# Patient Record
Sex: FEMALE | Race: WHITE | NOT HISPANIC OR LATINO | Employment: FULL TIME | URBAN - METROPOLITAN AREA
[De-identification: names, ages, dates, MRNs, and addresses within clinical notes are randomized per-mention and may not be internally consistent; named-entity substitution may affect disease eponyms.]

---

## 2021-05-29 ENCOUNTER — RECORDS - HEALTHEAST (OUTPATIENT)
Dept: ADMINISTRATIVE | Facility: CLINIC | Age: 21
End: 2021-05-29

## 2021-06-02 ENCOUNTER — RECORDS - HEALTHEAST (OUTPATIENT)
Dept: ADMINISTRATIVE | Facility: CLINIC | Age: 21
End: 2021-06-02

## 2021-06-16 PROBLEM — R51.9 HEADACHE: Status: ACTIVE | Noted: 2017-06-13

## 2022-06-29 ENCOUNTER — HOSPITAL ENCOUNTER (EMERGENCY)
Facility: CLINIC | Age: 22
Discharge: HOME | End: 2022-06-29
Attending: FAMILY MEDICINE | Admitting: FAMILY MEDICINE

## 2022-06-29 VITALS
TEMPERATURE: 98.1 F | HEART RATE: 83 BPM | SYSTOLIC BLOOD PRESSURE: 167 MMHG | HEIGHT: 71 IN | OXYGEN SATURATION: 97 % | DIASTOLIC BLOOD PRESSURE: 102 MMHG | RESPIRATION RATE: 20 BRPM | BODY MASS INDEX: 41.02 KG/M2 | WEIGHT: 293 LBS

## 2022-06-29 DIAGNOSIS — J03.90 TONSILLITIS: ICD-10-CM

## 2022-06-29 LAB
DEPRECATED S PYO AG THROAT QL EIA: NEGATIVE
GROUP A STREP BY PCR: NOT DETECTED

## 2022-06-29 PROCEDURE — 87651 STREP A DNA AMP PROBE: CPT | Performed by: FAMILY MEDICINE

## 2022-06-29 PROCEDURE — 99283 EMERGENCY DEPT VISIT LOW MDM: CPT

## 2022-06-29 RX ORDER — AMOXICILLIN 875 MG
875 TABLET ORAL 2 TIMES DAILY
Qty: 20 TABLET | Refills: 0 | Status: SHIPPED | OUTPATIENT
Start: 2022-06-29 | End: 2022-07-09

## 2022-06-29 ASSESSMENT — ENCOUNTER SYMPTOMS
SORE THROAT: 1
FEVER: 0
RHINORRHEA: 1
COUGH: 0
CHILLS: 1

## 2022-06-29 NOTE — Clinical Note
Adolfo Delarosa was seen and treated in our emergency department on 6/29/2022.  She may return to work on 06/30/2022.       If you have any questions or concerns, please don't hesitate to call.      Yogesh Wei MD

## 2022-06-29 NOTE — ED TRIAGE NOTES
Arrives to ED with c/o sore throat that began yesterday. Reports white patches. Chills yesterday. Last took ibuprofen yesterday.      Triage Assessment     Row Name 06/29/22 2799       Triage Assessment (Adult)    Airway WDL WDL       Respiratory WDL    Respiratory WDL WDL       Skin Circulation/Temperature WDL    Skin Circulation/Temperature WDL WDL       Cardiac WDL    Cardiac WDL X  HTN       Peripheral/Neurovascular WDL    Peripheral Neurovascular WDL WDL       Cognitive/Neuro/Behavioral WDL    Cognitive/Neuro/Behavioral WDL WDL

## 2022-06-29 NOTE — ED PROVIDER NOTES
EMERGENCY DEPARTMENT ENCOUNTER      NAME: Adolfo Delarosa  AGE: 22 year old female  YOB: 2000  MRN: [id number]  EVALUATION DATE & TIME: 6/29/2022 11:17 AM    PCP: [facility]    ED PROVIDER: Yogesh Wei M.D.    Chief Complaint   Patient presents with     Pharyngitis       FINAL IMPRESSION:  1. Tonsillitis        ED COURSE & MEDICAL DECISION MAKING:    Pertinent Labs & Imaging studies personally reviewed and interpreted by me. (See chart for details)  11:20 AM Patient seen and examined, prior records reviewed. Discussed plan for discharge which patient is agreeable with.  Differential diagnosis includes but not limited to pharyngitis, viral syndrome, mononucleosis, strep pharyngitis, peritonsillar abscess, retropharyngeal abscess.  Patient presents with throat pain.  On exam, anterior cervical adenopathy as well as tonsillar erythema and exudate, strep negative.  Patient is started on amoxicillin for presumed tonsillitis and follow-up with primary care.  No evidence for peritonsillar abscess, no voice changes.      At the conclusion of the encounter I discussed the results of all of the tests and the disposition. The questions were answered. The patient or family acknowledged understanding and was agreeable with the care plan.     PROCEDURES:   Procedures    MEDICATIONS GIVEN IN THE EMERGENCY:  Medications - No data to display    NEW PRESCRIPTIONS STARTED AT TODAY'S ER VISIT  Discharge Medication List as of 6/29/2022 11:26 AM      START taking these medications    Details   amoxicillin (AMOXIL) 875 MG tablet Take 1 tablet (875 mg) by mouth 2 times daily for 10 days, Disp-20 tablet, R-0, E-Prescribe             =================================================================    HPI    Patient information was obtained from: Patient      Adolfo Delarosa is a 22 year old female with a pertinent history of pulmonary embolism (on Xarelto) and obesity who presents to  "this ED by private vehicle for evaluation of pharyngitis.     Patient states that on 6/28 (~1 day ago) she developed a sore throat that has continued to persist on bilaterally. She endorses a \"little\" rhinorrhea and chills. She denies congestion, fever, and cough. She states she has an allergy to Motrin but took an ibuprofen yesterday for her symptoms. Patient denies additional medical concerns or complaints at this time.     Of note, patient's sister was ill last week.      REVIEW OF SYSTEMS   Review of Systems   Constitutional: Positive for chills. Negative for fever.   HENT: Positive for rhinorrhea (\"little\") and sore throat (bilaterally). Negative for congestion.    Respiratory: Negative for cough.       All other systems reviewed and negative    PAST MEDICAL HISTORY:  Past Medical History:   Diagnosis Date     Deep vein thrombosis (H)      Factor V Leiden (H)      Pulmonary embolism (H)        PAST SURGICAL HISTORY:  Past Surgical History:   Procedure Laterality Date     CHOLECYSTECTOMY      age 10 gallbladder removed     LUMBAR PUNCTURE FLUORO GUIDED DIAGNOSTIC  7/6/2020       CURRENT MEDICATIONS:    No current facility-administered medications for this encounter.     Current Outpatient Medications   Medication     amoxicillin (AMOXIL) 875 MG tablet     acetaZOLAMIDE (DIAMOX) 125 MG tablet     naproxen (NAPROSYN) 500 MG tablet     prochlorperazine (COMPAZINE) 5 MG tablet     rivaroxaban (XARELTO) 15 mg Tab       ALLERGIES:  Allergies   Allergen Reactions     Motrin [Ibuprofen] Swelling     Hand swelling       FAMILY HISTORY:  Family History   Problem Relation Age of Onset     Cancer Mother        SOCIAL HISTORY:   Social History     Socioeconomic History     Marital status: Single   Tobacco Use     Smoking status: Current Every Day Smoker     Packs/day: 0.50     Types: Cigarettes   Substance and Sexual Activity     Alcohol use: No     Drug use: No     Sexual activity: Never     Partners: Female " "      VITALS:  BP (!) 167/102   Pulse 83   Temp 98.1  F (36.7  C) (Oral)   Resp 20   Ht 1.803 m (5' 11\")   Wt 136.1 kg (300 lb)   LMP 06/20/2022   SpO2 97%   BMI 41.84 kg/m      PHYSICAL EXAM:  Physical Exam  Vitals and nursing note reviewed.   Constitutional:       Appearance: Normal appearance.   HENT:      Head: Normocephalic and atraumatic.      Right Ear: External ear normal.      Left Ear: External ear normal.      Nose: Nose normal.      Mouth/Throat:      Mouth: Mucous membranes are moist.      Tonsils: Tonsillar exudate (bilaterally) present.      Comments: Bilateral tonsillary erythema  Eyes:      Extraocular Movements: Extraocular movements intact.      Conjunctiva/sclera: Conjunctivae normal.      Pupils: Pupils are equal, round, and reactive to light.   Cardiovascular:      Rate and Rhythm: Normal rate and regular rhythm.   Pulmonary:      Effort: Pulmonary effort is normal.      Breath sounds: Normal breath sounds. No wheezing or rales.   Abdominal:      General: Abdomen is flat. There is no distension.      Palpations: Abdomen is soft.      Tenderness: There is no abdominal tenderness. There is no guarding.   Musculoskeletal:         General: Normal range of motion.      Cervical back: Normal range of motion and neck supple.      Right lower leg: No edema.      Left lower leg: No edema.   Lymphadenopathy:      Cervical: Cervical adenopathy (bilateral) present.   Skin:     General: Skin is warm and dry.   Neurological:      General: No focal deficit present.      Mental Status: She is alert and oriented to person, place, and time. Mental status is at baseline.      Comments: No gross focal neurologic deficits   Psychiatric:         Mood and Affect: Mood normal.         Behavior: Behavior normal.         Thought Content: Thought content normal.       LAB:  All pertinent labs reviewed and interpreted.  Results for orders placed or performed during the hospital encounter of 06/29/22 "   Streptococcus A Rapid Screen w/Reflex to PCR    Specimen: Throat; Swab   Result Value Ref Range    Group A Strep antigen Negative Negative       RADIOLOGY:  Reviewed all pertinent imaging. Please see official radiology report.  No orders to display     I, Shirley Arteaga, am serving as a scribe to document services personally performed by Dr. Wei based on my observation and the provider's statements to me. I, Yogesh Wei MD attest that Shirley Arteaga is acting in a scribe capacity, has observed my performance of the services and has documented them in accordance with my direction.    Yogesh Wei M.D.  Emergency Medicine  [facility] EMERGENCY ROOM  [address] [address] [phone]  Dept: [phone]     Yogesh Wei MD  06/29/22 8825

## 2023-03-29 ENCOUNTER — TELEPHONE (OUTPATIENT)
Dept: NEUROLOGY | Facility: CLINIC | Age: 23
End: 2023-03-29
Payer: COMMERCIAL

## 2023-03-29 ENCOUNTER — HOSPITAL ENCOUNTER (EMERGENCY)
Facility: CLINIC | Age: 23
Discharge: HOME | End: 2023-03-29
Attending: EMERGENCY MEDICINE | Admitting: EMERGENCY MEDICINE
Payer: COMMERCIAL

## 2023-03-29 VITALS
SYSTOLIC BLOOD PRESSURE: 157 MMHG | HEIGHT: 71 IN | BODY MASS INDEX: 41.02 KG/M2 | DIASTOLIC BLOOD PRESSURE: 82 MMHG | WEIGHT: 293 LBS | RESPIRATION RATE: 18 BRPM | OXYGEN SATURATION: 99 % | TEMPERATURE: 98.3 F | HEART RATE: 67 BPM

## 2023-03-29 DIAGNOSIS — R51.9 HEADACHE: ICD-10-CM

## 2023-03-29 DIAGNOSIS — G93.2 PSEUDOTUMOR CEREBRI: ICD-10-CM

## 2023-03-29 PROCEDURE — 99284 EMERGENCY DEPT VISIT MOD MDM: CPT

## 2023-03-29 RX ORDER — METOCLOPRAMIDE 10 MG/1
10 TABLET ORAL 4 TIMES DAILY PRN
Qty: 15 TABLET | Refills: 0 | Status: SHIPPED | OUTPATIENT
Start: 2023-03-29

## 2023-03-29 RX ORDER — ACETAZOLAMIDE 250 MG/1
TABLET ORAL
Qty: 180 TABLET | Refills: 0 | Status: SHIPPED | OUTPATIENT
Start: 2023-03-29 | End: 2023-04-23

## 2023-03-29 RX ORDER — DIPHENHYDRAMINE HCL 25 MG
25 CAPSULE ORAL EVERY 6 HOURS PRN
Qty: 15 CAPSULE | Refills: 0 | Status: SHIPPED | OUTPATIENT
Start: 2023-03-29

## 2023-03-29 RX ORDER — TOPIRAMATE 50 MG/1
50 TABLET, FILM COATED ORAL 2 TIMES DAILY
Qty: 60 TABLET | Refills: 0 | Status: SHIPPED | OUTPATIENT
Start: 2023-03-29 | End: 2023-04-28

## 2023-03-29 ASSESSMENT — ENCOUNTER SYMPTOMS
NAUSEA: 0
SPEECH DIFFICULTY: 0
FACIAL ASYMMETRY: 0
BLOOD IN STOOL: 0
NUMBNESS: 0
CHILLS: 0
PHOTOPHOBIA: 1
HEADACHES: 1
MYALGIAS: 0
SHORTNESS OF BREATH: 0
COUGH: 0
WEAKNESS: 0
VOMITING: 0
FEVER: 0

## 2023-03-29 ASSESSMENT — ACTIVITIES OF DAILY LIVING (ADL): ADLS_ACUITY_SCORE: 35

## 2023-03-29 NOTE — ED PROVIDER NOTES
"EMERGENCY DEPARTMENT MIDLEVEL SUPERVISORY NOTE    Date/Time: 3/29/2023 6:12 PM    I am seeing this patient along with Gina Aguilar PA-C.  I have seen and evaluated the patient independently at bedside and agree with the GISELE's history, assessment and plan.  I personally saw the patient and performed a substantive portion of the visit including all aspects of the medical decision making.      BRIEF HPI    The patient reported having headaches. She reported having a long history of headaches and has had lumbar taps in the past with neurology. As of recent, she started having intermittent headaches and has been persistent for a couple of days then resolve on their own. Normally, her headaches resolve with sleep and worsens throughout the day. She is associated photophobia and sonophobia with her headaches. She notes blurry vision when her headaches are severe. She endorsed nausea earlier this morning, but has resolved since. She took 1200 mg ibuprofen this morning at 1100, with no significant relief. The patient denied any other complaints or concerns at this time.    BRIEF PHYSICAL EXAM  Vitals: BP (!) 157/82   Pulse 67   Temp 98.3  F (36.8  C) (Oral)   Resp 18   Ht 1.803 m (5' 11\")   Wt (!) 154.2 kg (340 lb)   LMP 02/25/2023 (Approximate)   SpO2 99%   BMI 47.42 kg/m    General: Appears in no acute distress, awake, alert, interactive.  Eyes: Conjunctivae clear.  EOMs intact.  Pupils round and reactive.  HENT: Atraumatic. MMM.   Neck: Full AROM.  Cardiovascular: Regular rate and rhythm.  Respiratory/Chest: Normal work of breathing. Speaking in full sentences.  Abdomen: Non-distended.  Musculoskeletal: Normal appearing extremities without obvious deformities or signs of trauma. No edema or erythema.  Skin: Normal color. No visible rash or diaphoresis.  Neurologic: Alert. Face symmetric, moves all extremities spontaneously. Speech clear.  Ambulatory.  Psychiatric: Affect appropriate, cooperative.      MEDICAL " DECISION MAKING    Pertinent Labs and Imaging reviewed (see chart for details)    Adolfo Delarosa is a 23 year old year old who presents for evaluation of a headache.  Records reviewed.  Patient has a history of pseudotumor cerebri diagnosed several years ago by lumbar puncture indicating elevated pressures.  She did briefly follow with neurology but then had difficulty with insurance so has not been able to follow-up.  In the past, she has undergone LP for management but not for several years.  Recently, she has been having headaches increasing in severity prompting her to present to [facility] ED on 1/9/2023.  At that time, neurology recommended a trial of Topamax 25 mg twice daily, MRI brain if not improving, LP if treatment resistant headaches, and follow-up in clinic.  Today, patient reports that she has been having headaches over the past week that have been more severe than what she had been experiencing previously.  She has tried using the Topamax but not had any relief.  She denies associated vision change and has no other new complaints.  Vitals on arrival notable for elevated blood pressure but otherwise stable. History and physical exam, as above.     Patient was initially evaluated by PA.  Patient was not particularly interested in LP here today, more for other options to manage her symptoms until she can get into see neurology.  She does now have insurance.    In addition to pain related to known pseudotumor cerebri/intracranial hypertension, also considered migraine, tension headache, occipital neuralgia, or other primary headache.  I have lower suspicion for GCS, meningitis, encephalitis, ICH, SDH, SAH given history and exam.  Patient has no focal neurodeficits and no acute vision changes to suggest significantly elevated pressures today.    PA discussed the case with Dr. Leija with neurology.  He recommended increasing Topamax to 50 mg twice daily or trialing Diamox 500 mg twice daily then  increasing to 1000 mg twice daily after 5 days.  Also recommended follow-up with ophthalmology to assess optic disks.  Patient should be advised to return to the ED if worsening pain or new visual symptoms and to consider high-volume LP.  Patient was updated and comfortable with this plan.  She was offered labs and imaging as well as LP but declined.  She also did not want any interventions here in the emergency department, she was hoping to drive home today.  PA wrote prescriptions for Diamox as well as Reglan and ibuprofen and also provided contact information for neurology and ophthalmology.  Vitals have remained stable and patient is tolerating p.o. and ambulatory without assistance.  So I do believe this is a reasonable plan.    At conclusion of encounter I discussed results of all of tests and recommendation for disposition. All questions were answered. Patient acknowledged understanding and was agreeable with care plan.     Please see midlevel note for additional details. I personally saw the patient and performed a substantive portion of the visit including all aspects of the medical decision making.     FINAL IMPRESSION       Headache  Pseudotumor cerebri      I, Amaury Madrid, am serving as a scribe to document services personally performed by Gina Echols  based on my observation and the provider's statements to me. I,   Gina Echols, attest that Amaury Madrid is acting in a scribe capacity, has observed my performance of the services and has documented them in accordance with my direction.    Gina Echols MD  Emergency Medicine  3/29/2023 6:12 PM       Gina Echols MD  03/31/23 2224

## 2023-03-29 NOTE — DISCHARGE INSTRUCTIONS
You were seen and evaluated here in the emergency department for your headaches.  I did speak with neurology who recommends either starting you on topiramate 50 mg twice daily or acetazolamide AKA Diamox 500 mg twice daily for 5 days and increase to 1000 mg twice daily if tolerated.  As we discussed, normal side effects of the Diamox include tingling in the fingers and metallic taste in your mouth.  As recommended, do not take the Diamox and topiramate at the same time she was one of the other.  You can start with 1 and if this is not helping you can switch to the other medication.    I will also discharge you home with some Reglan, Benadryl and recommend taking Tylenol 1000 mg and ibuprofen 600 mg every 6 hours as needed for headaches.    Ultimately, you will need to follow-up with both ophthalmology and neurology.  Referrals for both of these specialties have been placed and they should be reaching out to you to schedule an appointment.  If you do not hear from them by next week, you should call the numbers on your discharge paperwork.    Return to the emergency department for any significant sudden onset of headache, severe vision loss or any other concerning symptoms.

## 2023-03-30 ENCOUNTER — TELEPHONE (OUTPATIENT)
Dept: OPHTHALMOLOGY | Facility: CLINIC | Age: 23
End: 2023-03-30
Payer: COMMERCIAL

## 2023-03-30 NOTE — TELEPHONE ENCOUNTER
[facility] Call Center    Phone Message    May a detailed message be left on voicemail: yes     Reason for Call: Appointment Intake    Referring Provider Name: Gina Aguilar PA-C  Diagnosis and/or Symptoms: Pseudotumor cerebri    Sending TE per protocols. Please call pt to schedule. Thank you.    Action Taken: Message routed to:  [facility] ([facility] EYE    Travel Screening: Not Applicable

## 2023-03-30 NOTE — TELEPHONE ENCOUNTER
[facility] Call Center    Phone Message    May a detailed message be left on voicemail: yes     Reason for Call: Other: Pt calling in returning missed call to [facility], please call pt back as writer unable to find appt pt requesting.      Action Taken: Message routed to:  Other: eye     Travel Screening: Not Applicable.

## 2023-03-30 NOTE — TELEPHONE ENCOUNTER
Called by  ED regarding this patient.  Per provider history of pseudotumor cerebri with elevated opening pressure up to 65 in the past.  Has been lost to follow-up due to insurance.  Commend to the ED with headaches.  Is on low-dose topiramate.  Has not establish care with neurology or ophthalmology yet.  Per ED provider patient is refusing lumbar puncture.  Treatments for IIH are limited.  Fortunately she is currently denying any visual symptoms and headache is not entirely persistent.  Would advise increasing the topiramate to 50 mg twice daily.  Alternatively would assess if Diamox has been tried in the past.  Starting dose 500 mg twice daily, and increasing to 1000 mg twice daily after 5 days.  Patient will need follow-up in neurology and especially with ophthalmology to assess her optic disks.  If definitively worsening or developing any new visual symptoms, should be instructed to come back to the ED to consider high-volume lumbar puncture.  Okay for symptomatic treatment of headache in the ED with things like a migraine cocktail of Toradol, and Compazine, as it is not entirely clear at this juncture if her headache is IIH related or not.    Nancy Leija, DO

## 2023-03-30 NOTE — TELEPHONE ENCOUNTER
Reviewed with Dr. Joseph and recommends general ophthalmology within three weeks or with Dr. Mak within three weeks on same day appt time slot if general not available to rule out papilledema    Pt prescribed 20 day supply of diamox/topamax by Neurology at ED visit yesterday    No noted vision changes on ED note-- seen for headaches    Note to patient communicator to reach out for scheduling    Ellis Monae RN 1:29 PM 03/30/23

## 2023-03-30 NOTE — TELEPHONE ENCOUNTER
Called and LVM     Please schedule Adolfo in three weeks ( around 4/20) with Dr. Mak      Reviewed with Dr. Joseph recommends general ophthalmology within three weeks or with Dr. Mak within three weeks on same day appt time slot if general not available to rule out papilledema      Liz Harper Communication Facilitator on 3/30/2023 at 2:02 PM

## 2023-03-30 NOTE — TELEPHONE ENCOUNTER
Talked to patient  scheduled with  Dr. Mak  on 4/1123 @8:30   -   ls  also mailed new packet and map

## 2023-03-31 DIAGNOSIS — G93.2 PSEUDOTUMOR CEREBRI: Primary | ICD-10-CM

## 2023-03-31 NOTE — PROGRESS NOTES
HPI:  Patient was seen at the ED on 03/29/2023 for headaches. Due to a history of pseudotumor cerebri, patient was discharged on topiramate. Patient presents today to rule out papilledema. Headaches have improved today 04/11/2023.       Pertinent Medical History:    Headache    Pseudotumor cerebri    Post lumbar puncture headache    Morbid obesity    Pulmonary embolism    DVT    Ocular History:    Papilledema, both eyes.     Corneal scar, left eye. Since high school     Eye Medications:    None    Assessment and Plan:  1.   History of papilledema, both eyes. Patient does not recall a history of papilledema.     Visual field 04/11/2023: Both eyes: normal    Optic nerve OCT 04/11/2023: Both eyes: no edema    Macular OCT 04/11/2023:     Patient was seen at the ED on 03/29/2023 for headaches. Due to a history of pseudotumor cerebri, patient was discharged on topiramate. Patient presents today to rule out papilledema. Per neurology recommendations, recommend MRI and LP if there are worsening symptoms.      Today, there is no papilledema seen on exam. There is macular pigmentary changes seen on both eyes but that is not contributing to headaches.  Recommend follow up with neurology for headache management. Return immediately if there is diplopia, decrease in vision, eye pain, or redness.     2.   Macular Pigmentary Changes, both eyes.     Macular OCT 04/11/2023:  Right eye: Mild RPE changes, Left eye: normal    No family history of macular degeneration.     Recommend follow up in 6 months in the retina service.     3.   Central corneal Scar, left eye.     Scar was present since high school - unsure how she got the scar.     Slightly impacting central vision. Best corrected vision is 20/25 in the left eye today 04/11/2023    Might cause more glare at night.     4.   Dry Eye Syndrome, both eyes.     Preservative free artificial tears 4 times daily, both eyes as needed. Refresh or systane.     Patient consented to a dilated  eye exam:    Yes. Side effects discussed.    Medical History:  Past Medical History:   Diagnosis Date     Deep vein thrombosis (H)      Factor V Leiden (H)      Pulmonary embolism (H)        Medications:  Current Outpatient Medications   Medication Sig Dispense Refill     acetaZOLAMIDE (DIAMOX) 250 MG tablet Take 2 tablets (500 mg) by mouth 2 times daily for 5 days, THEN 4 tablets (1,000 mg) 2 times daily for 20 days. 180 tablet 0     diphenhydrAMINE (BENADRYL) 25 MG capsule Take 1 capsule (25 mg) by mouth every 6 hours as needed (take as needed with reglan for headaches and nausea) 15 capsule 0     metoclopramide (REGLAN) 10 MG tablet Take 1 tablet (10 mg) by mouth 4 times daily as needed (headache, nausea) 15 tablet 0     naproxen (NAPROSYN) 500 MG tablet [NAPROXEN (NAPROSYN) 500 MG TABLET] Take 1 tablet (500 mg total) by mouth 2 (two) times a day with meals. 15 tablet 0     prochlorperazine (COMPAZINE) 5 MG tablet [PROCHLORPERAZINE (COMPAZINE) 5 MG TABLET] 1-2 tabs po q8 hours as needed for nausea 10 tablet 0     rivaroxaban (XARELTO) 15 mg Tab [RIVAROXABAN (XARELTO) 15 MG TAB] Take 1 tablet (15 mg total) by mouth 2 (two) times a day with meals. 42 tablet 0     topiramate (TOPAMAX) 50 MG tablet Take 1 tablet (50 mg) by mouth 2 times daily for 30 days 60 tablet 0   Complete documentation of historical and exam elements from today's encounter can be found in the full encounter summary report (not reduplicated in this progress note). I personally obtained the chief complaint(s) and history of present illness.  I confirmed and edited as necessary the review of systems, past medical/surgical history, family history, social history, and examination findings as documented by others; and I examined the patient myself. I personally reviewed the relevant tests, images, and reports as documented above. I formulated and edited as necessary the assessment and plan and discussed the findings and management plan with the  patient and family. - Shira Bentley, SEEMA

## 2023-04-11 ENCOUNTER — OFFICE VISIT (OUTPATIENT)
Dept: OPHTHALMOLOGY | Facility: CLINIC | Age: 23
End: 2023-04-11
Attending: OPTOMETRIST
Payer: COMMERCIAL

## 2023-04-11 DIAGNOSIS — G93.2 PSEUDOTUMOR CEREBRI: ICD-10-CM

## 2023-04-11 DIAGNOSIS — H35.89 RPE MOTTLING OF MACULA: Primary | ICD-10-CM

## 2023-04-11 PROCEDURE — 92134 CPTRZ OPH DX IMG PST SGM RTA: CPT | Performed by: OPTOMETRIST

## 2023-04-11 PROCEDURE — 99207 OCT OPTIC NERVE RNFL SPECTRALIS OU (BOTH EYES): CPT | Mod: 26 | Performed by: OPTOMETRIST

## 2023-04-11 PROCEDURE — 92004 COMPRE OPH EXAM NEW PT 1/>: CPT | Performed by: OPTOMETRIST

## 2023-04-11 PROCEDURE — 92083 EXTENDED VISUAL FIELD XM: CPT | Performed by: OPTOMETRIST

## 2023-04-11 PROCEDURE — G0463 HOSPITAL OUTPT CLINIC VISIT: HCPCS | Performed by: OPTOMETRIST

## 2023-04-11 PROCEDURE — 92133 CPTRZD OPH DX IMG PST SGM ON: CPT | Performed by: OPTOMETRIST

## 2023-04-11 ASSESSMENT — REFRACTION_MANIFEST
OD_CYLINDER: +0.25
OS_CYLINDER: +0.25
OD_SPHERE: PLANO
OS_AXIS: 080
OD_AXIS: 100
OS_SPHERE: +0.25

## 2023-04-11 ASSESSMENT — SLIT LAMP EXAM - LIDS
COMMENTS: NORMAL
COMMENTS: NORMAL

## 2023-04-11 ASSESSMENT — EXTERNAL EXAM - LEFT EYE: OS_EXAM: NORMAL

## 2023-04-11 ASSESSMENT — CONF VISUAL FIELD
OS_NORMAL: 1
OD_INFERIOR_TEMPORAL_RESTRICTION: 0
OS_SUPERIOR_NASAL_RESTRICTION: 0
OD_NORMAL: 1
OS_SUPERIOR_TEMPORAL_RESTRICTION: 0
OS_INFERIOR_TEMPORAL_RESTRICTION: 0
OD_SUPERIOR_TEMPORAL_RESTRICTION: 0
OD_INFERIOR_NASAL_RESTRICTION: 0
METHOD: COUNTING FINGERS
OS_INFERIOR_NASAL_RESTRICTION: 0
OD_SUPERIOR_NASAL_RESTRICTION: 0

## 2023-04-11 ASSESSMENT — VISUAL ACUITY
OD_SC+: +1
OD_SC: 20/20
METHOD: SNELLEN - LINEAR
OS_SC: 20/30

## 2023-04-11 ASSESSMENT — TONOMETRY
IOP_METHOD: TONOPEN
OD_IOP_MMHG: 17
OS_IOP_MMHG: 16

## 2023-04-11 ASSESSMENT — EXTERNAL EXAM - RIGHT EYE: OD_EXAM: NORMAL

## 2023-04-11 NOTE — NURSING NOTE
Chief Complaints and History of Present Illnesses   Patient presents with     Papilledema Evaluation     Chief Complaint(s) and History of Present Illness(es)     Papilledema Evaluation           Comments    Pt states vision is blurry when she experiences a headache. Last headache was yesterday. No flashes. Occasional floater in LE that comes and goes. No redness or dryness. No eye pain today.    HARPAL Ahn April 11, 2023 8:52 AM

## 2024-07-01 ENCOUNTER — HOSPITAL ENCOUNTER (EMERGENCY)
Facility: CLINIC | Age: 24
Discharge: HOME | End: 2024-07-01
Attending: EMERGENCY MEDICINE | Admitting: EMERGENCY MEDICINE

## 2024-07-01 ENCOUNTER — APPOINTMENT (OUTPATIENT)
Dept: ULTRASOUND IMAGING | Facility: CLINIC | Age: 24
End: 2024-07-01
Attending: EMERGENCY MEDICINE

## 2024-07-01 VITALS
HEART RATE: 66 BPM | OXYGEN SATURATION: 99 % | RESPIRATION RATE: 16 BRPM | DIASTOLIC BLOOD PRESSURE: 90 MMHG | WEIGHT: 293 LBS | SYSTOLIC BLOOD PRESSURE: 151 MMHG | BODY MASS INDEX: 46.03 KG/M2 | TEMPERATURE: 98.4 F

## 2024-07-01 DIAGNOSIS — I82.432 ACUTE DEEP VEIN THROMBOSIS (DVT) OF POPLITEAL VEIN OF LEFT LOWER EXTREMITY (H): ICD-10-CM

## 2024-07-01 LAB
ANION GAP SERPL CALCULATED.3IONS-SCNC: 10 MMOL/L (ref 7–15)
APTT PPP: 26 SECONDS (ref 22–38)
BASOPHILS # BLD AUTO: 0.1 10E3/UL (ref 0–0.2)
BASOPHILS NFR BLD AUTO: 1 %
BUN SERPL-MCNC: 12.7 MG/DL (ref 6–20)
CALCIUM SERPL-MCNC: 8.8 MG/DL (ref 8.6–10)
CHLORIDE SERPL-SCNC: 105 MMOL/L (ref 98–107)
CREAT SERPL-MCNC: 0.92 MG/DL (ref 0.51–0.95)
DEPRECATED HCO3 PLAS-SCNC: 24 MMOL/L (ref 22–29)
EGFRCR SERPLBLD CKD-EPI 2021: 88 ML/MIN/1.73M2
EOSINOPHIL # BLD AUTO: 0.4 10E3/UL (ref 0–0.7)
EOSINOPHIL NFR BLD AUTO: 4 %
ERYTHROCYTE [DISTWIDTH] IN BLOOD BY AUTOMATED COUNT: 12.4 % (ref 10–15)
GLUCOSE SERPL-MCNC: 84 MG/DL (ref 70–99)
HCT VFR BLD AUTO: 43.5 % (ref 35–47)
HGB BLD-MCNC: 14.6 G/DL (ref 11.7–15.7)
IMM GRANULOCYTES # BLD: 0 10E3/UL
IMM GRANULOCYTES NFR BLD: 0 %
INR PPP: 0.97 (ref 0.85–1.15)
LYMPHOCYTES # BLD AUTO: 3.2 10E3/UL (ref 0.8–5.3)
LYMPHOCYTES NFR BLD AUTO: 32 %
MCH RBC QN AUTO: 31.8 PG (ref 26.5–33)
MCHC RBC AUTO-ENTMCNC: 33.6 G/DL (ref 31.5–36.5)
MCV RBC AUTO: 95 FL (ref 78–100)
MONOCYTES # BLD AUTO: 0.8 10E3/UL (ref 0–1.3)
MONOCYTES NFR BLD AUTO: 8 %
NEUTROPHILS # BLD AUTO: 5.6 10E3/UL (ref 1.6–8.3)
NEUTROPHILS NFR BLD AUTO: 56 %
NRBC # BLD AUTO: 0 10E3/UL
NRBC BLD AUTO-RTO: 0 /100
PLATELET # BLD AUTO: 216 10E3/UL (ref 150–450)
POTASSIUM SERPL-SCNC: 4.1 MMOL/L (ref 3.4–5.3)
RBC # BLD AUTO: 4.59 10E6/UL (ref 3.8–5.2)
SODIUM SERPL-SCNC: 139 MMOL/L (ref 135–145)
WBC # BLD AUTO: 10 10E3/UL (ref 4–11)

## 2024-07-01 PROCEDURE — 36415 COLL VENOUS BLD VENIPUNCTURE: CPT | Performed by: EMERGENCY MEDICINE

## 2024-07-01 PROCEDURE — 85610 PROTHROMBIN TIME: CPT | Performed by: EMERGENCY MEDICINE

## 2024-07-01 PROCEDURE — 80048 BASIC METABOLIC PNL TOTAL CA: CPT | Performed by: EMERGENCY MEDICINE

## 2024-07-01 PROCEDURE — 85730 THROMBOPLASTIN TIME PARTIAL: CPT | Performed by: EMERGENCY MEDICINE

## 2024-07-01 PROCEDURE — 99284 EMERGENCY DEPT VISIT MOD MDM: CPT | Mod: 25

## 2024-07-01 PROCEDURE — 93971 EXTREMITY STUDY: CPT | Mod: LT

## 2024-07-01 PROCEDURE — 85025 COMPLETE CBC W/AUTO DIFF WBC: CPT | Performed by: EMERGENCY MEDICINE

## 2024-07-01 RX ORDER — APIXABAN 5 MG (74)
KIT ORAL
Qty: 74 EACH | Refills: 0 | Status: SHIPPED | OUTPATIENT
Start: 2024-07-01 | End: 2024-07-31

## 2024-07-01 ASSESSMENT — COLUMBIA-SUICIDE SEVERITY RATING SCALE - C-SSRS
6. HAVE YOU EVER DONE ANYTHING, STARTED TO DO ANYTHING, OR PREPARED TO DO ANYTHING TO END YOUR LIFE?: NO
1. IN THE PAST MONTH, HAVE YOU WISHED YOU WERE DEAD OR WISHED YOU COULD GO TO SLEEP AND NOT WAKE UP?: NO
2. HAVE YOU ACTUALLY HAD ANY THOUGHTS OF KILLING YOURSELF IN THE PAST MONTH?: NO

## 2024-07-01 ASSESSMENT — ENCOUNTER SYMPTOMS: NUMBNESS: 0

## 2024-07-01 NOTE — ED PROVIDER NOTES
Emergency Department Encounter      NAME: Adolfo Delarosa  AGE: 24 year old female  YOB: 2000  MRN: [id number]  EVALUATION DATE & TIME: No admission date for patient encounter.    PCP: Yuli Vann    ED PROVIDER: Baldomero Crawley M.D.      Chief Complaint   Patient presents with    Leg Pain         FINAL IMPRESSION:  1. Acute deep vein thrombosis (DVT) of popliteal vein of left lower extremity (H)        MEDICAL DECISION MAKIN:09 PM I met with the patient, obtained history, performed an initial exam, and discussed options and plan for diagnostics and treatment here in the ED.      24-year-old female with a history of Leiden factor V and pulmonary embolism who presents with left leg pain.  She says that the pain is 8 out of 10 and worsens when she stands.  She was on Xarelto for the pulmonary embolism but stopped it 2 years ago because of insurance problems.  The patient had some swelling and popliteal pain on exam in the left leg.  She did not have any chest pain, shortness of breath or syncope.  No hemoptysis.  An ultrasound was done of the left leg which showed DVT.  I Independently reviewed and interpreted the ultrasound.  She was started on the Eliquis starter pack and we discussed that the initial prescription is covered by the company so it would give her some time to obtain health insurance.      Pertinent Labs & Imaging studies reviewed. (See chart for details)    The importance of close follow up was discussed. We reviewed warning signs and symptoms, and I instructed Ms. Delarosa to return to the emergency department immediately if she develops any new or worsening symptoms. I provided additional verbal discharge instructions. Ms. Delarosa expressed understanding and agreement with this plan of care, her questions were answered, and she was discharged in stable condition.     Medical Decision Making     History:  Supplemental history from: Documented in chart, if  applicable  External Record(s) reviewed: Documented in chart, if applicable.     Work Up:  Chart documentation includes differential considered and any EKGs or imaging independently interpreted by provider, where specified.  In additional to work up documented, I considered the following work up: Documented in chart, if applicable.     External consultation:  Discussion of management with another provider: Documented in chart, if applicable     Complicating factors:  Care impacted by chronic illness: Factor V Leiden  Care affected by social determinants of health: Access to Medical Care     Disposition considerations: Discharged with prescription for Eliquis.      MEDICATIONS GIVEN IN THE EMERGENCY:  Medications - No data to display    NEW PRESCRIPTIONS STARTED AT TODAY'S ER VISIT:  Discharge Medication List as of 7/1/2024  8:29 PM        START taking these medications    Details   Apixaban Starter Pack (ELIQUIS DVT/PE STARTER PACK) 5 MG TBPK Take 10 mg by mouth 2 times daily for 7 days, THEN 5 mg 2 times daily for 23 days., Disp-74 each, R-0, Local Print                =================================================================    HPI    Patient information was obtained from: Patient     Use of : N/A         Adolfo RUSTY Delarosa is a 24 year old female with a past medical history of Factor V Leiden and a pulmonary embolism 8 years ago who presents to the ED by private vehicle for evaluation of left leg pain.    The patient endorses 1 week of left leg pain from behind the left knee radiating down. She states that the pain is an 8 out of 10 at rest but can shoot up to a 10 out of 10 when she stands up after sitting for a long time. Patient denies recent travel, long car rides or numbness in the left toes. She is not on birth control but does vape.     She was on Xarelto in the past due to her pulmonary embolism but has not been on it for 2 years due to insurance issues. She does not currently have  insurance but is in the process of getting it through work. No other complaints at this time.       REVIEW OF SYSTEMS   Review of Systems   Musculoskeletal:         Positive for left leg pain   Neurological:  Negative for numbness.   All other systems reviewed and are negative.       PAST MEDICAL HISTORY:  Past Medical History:   Diagnosis Date    Deep vein thrombosis (H)     Factor V Leiden (H24)     History of pulmonary embolism     Pulmonary embolism (H)        PAST SURGICAL HISTORY:  Past Surgical History:   Procedure Laterality Date    CHOLECYSTECTOMY      age 10 gallbladder removed    LUMBAR PUNCTURE FLUORO GUIDED DIAGNOSTIC  7/6/2020       CURRENT MEDICATIONS:    No current facility-administered medications for this encounter.    Current Outpatient Medications:     Apixaban Starter Pack (ELIQUIS DVT/PE STARTER PACK) 5 MG TBPK, Take 10 mg by mouth 2 times daily for 7 days, THEN 5 mg 2 times daily for 23 days., Disp: 74 each, Rfl: 0    acetaZOLAMIDE (DIAMOX) 250 MG tablet, Take 2 tablets (500 mg) by mouth 2 times daily for 5 days, THEN 4 tablets (1,000 mg) 2 times daily for 20 days., Disp: 180 tablet, Rfl: 0    diphenhydrAMINE (BENADRYL) 25 MG capsule, Take 1 capsule (25 mg) by mouth every 6 hours as needed (take as needed with reglan for headaches and nausea), Disp: 15 capsule, Rfl: 0    metoclopramide (REGLAN) 10 MG tablet, Take 1 tablet (10 mg) by mouth 4 times daily as needed (headache, nausea), Disp: 15 tablet, Rfl: 0    naproxen (NAPROSYN) 500 MG tablet, [NAPROXEN (NAPROSYN) 500 MG TABLET] Take 1 tablet (500 mg total) by mouth 2 (two) times a day with meals., Disp: 15 tablet, Rfl: 0    prochlorperazine (COMPAZINE) 5 MG tablet, [PROCHLORPERAZINE (COMPAZINE) 5 MG TABLET] 1-2 tabs po q8 hours as needed for nausea, Disp: 10 tablet, Rfl: 0    rivaroxaban (XARELTO) 15 mg Tab, [RIVAROXABAN (XARELTO) 15 MG TAB] Take 1 tablet (15 mg total) by mouth 2 (two) times a day with meals., Disp: 42 tablet, Rfl: 0     topiramate (TOPAMAX) 50 MG tablet, Take 1 tablet (50 mg) by mouth 2 times daily for 30 days, Disp: 60 tablet, Rfl: 0    ALLERGIES:  No Known Allergies    FAMILY HISTORY:  Family History   Problem Relation Age of Onset    Cancer Mother     Glaucoma No family hx of     Macular Degeneration No family hx of        SOCIAL HISTORY:   Social History     Socioeconomic History    Marital status: Single   Tobacco Use    Smoking status: Every Day     Current packs/day: 0.50     Types: Cigarettes   Substance and Sexual Activity    Alcohol use: No    Drug use: No    Sexual activity: Never     Partners: Female     Social Determinants of Health      [facility]    Financial Resource Strain    Received from [facility], [facility]    Social Connections       PHYSICAL EXAM:    Vitals: BP (!) 151/90   Pulse 66   Temp 98.4  F (36.9  C) (Oral)   Resp 16   Wt 149.7 kg (330 lb)   LMP 06/17/2024   SpO2 99%   BMI 46.03 kg/m     Constitutional: Well developed, well nourished. Comfortable appearing.  HEAD:Normocephalic, atraumatic,   Neck- Supple, gross ROM intact.  No JVD.  No palpable nodes.  Pulmonary: Clear to auscultation bilaterally, no respiratory distress, no wheezing, speaks full sentences easily.  Chest: No chest wall tenderness  Cardiovascular: Normal heart rate, regular rhythm, no murmurs. No lower extremity edema, 2+ DP pulses.   GI: Soft, no tenderness to deep palpation in all quadrants, not distended, no masses.  No hepatosplenomegaly.  Musculoskeletal: Moving all 4 extremities intentionally and without pain. No obvious deformity. 1+ non pitting edema. Left popliteal fossa tender.     LAB:  All pertinent labs reviewed and interpreted.  Labs Ordered and Resulted from Time of ED Arrival to Time of ED Departure   BASIC METABOLIC PANEL - Normal       Result Value     Sodium 139      Potassium 4.1      Chloride 105      Carbon Dioxide (CO2) 24      Anion Gap 10      Urea Nitrogen 12.7      Creatinine 0.92      GFR Estimate 88      Calcium 8.8      Glucose 84     INR - Normal    INR 0.97     PARTIAL THROMBOPLASTIN TIME - Normal    aPTT 26     CBC WITH PLATELETS AND DIFFERENTIAL    WBC Count 10.0      RBC Count 4.59      Hemoglobin 14.6      Hematocrit 43.5      MCV 95      MCH 31.8      MCHC 33.6      RDW 12.4      Platelet Count 216      % Neutrophils 56      % Lymphocytes 32      % Monocytes 8      % Eosinophils 4      % Basophils 1      % Immature Granulocytes 0      NRBCs per 100 WBC 0      Absolute Neutrophils 5.6      Absolute Lymphocytes 3.2      Absolute Monocytes 0.8      Absolute Eosinophils 0.4      Absolute Basophils 0.1      Absolute Immature Granulocytes 0.0      Absolute NRBCs 0.0         RADIOLOGY:  US Lower Extremity Venous Duplex Left   Final Result   IMPRESSION:   1.  Short segment nonocclusive thrombus within the left popliteal vein.   2.  Short segment thrombus within the small saphenous vein.              I, Hortencia Mcdermott, am serving as a scribe to document services personally performed by Dr. Baldomero Crawley based on my observation and the provider's statements to me. I, Baldomero Crawley M.D. attest that Hortencia Mcdermott is acting in a scribe capacity, has observed my performance of the services and has documented them in accordance with my direction.      Baldomero Crawley M.D.  Emergency Medicine  [facility] EMERGENCY ROOM  [address] [id number] [phone]  Dept: [phone]     Baldomero Crawley MD  08/02/24 1718       Baldomero Crawley MD  08/02/24 1719

## 2024-07-01 NOTE — ED TRIAGE NOTES
Pt has pain behind left knee down to her foot.  She has a history of blood clot approx 8 years ago and had been on thinners and is worried that might happen again.  Denies injury     Triage Assessment (Adult)       Row Name 07/01/24 1512          Triage Assessment    Airway WDL WDL        Respiratory WDL    Respiratory WDL WDL        Skin Circulation/Temperature WDL    Skin Circulation/Temperature WDL WDL        Cardiac WDL    Cardiac WDL WDL        Peripheral/Neurovascular WDL    Peripheral Neurovascular WDL WDL        Cognitive/Neuro/Behavioral WDL    Cognitive/Neuro/Behavioral WDL WDL

## 2024-07-02 NOTE — ED NOTES
AVS reviewed with pt. Education provided on worsening symptoms to watch out for, new medication, and close follow-up with PCP. All questions were answered. Vitally stable upon discharge.